# Patient Record
Sex: MALE | Race: OTHER | Employment: PART TIME | URBAN - METROPOLITAN AREA
[De-identification: names, ages, dates, MRNs, and addresses within clinical notes are randomized per-mention and may not be internally consistent; named-entity substitution may affect disease eponyms.]

---

## 2020-06-02 ENCOUNTER — HOSPITAL ENCOUNTER (EMERGENCY)
Facility: HOSPITAL | Age: 30
Discharge: HOME | End: 2020-06-02
Attending: EMERGENCY MEDICINE

## 2020-06-02 ENCOUNTER — APPOINTMENT (OUTPATIENT)
Dept: CT IMAGING | Facility: HOSPITAL | Age: 30
End: 2020-06-02
Attending: EMERGENCY MEDICINE

## 2020-06-02 VITALS
OXYGEN SATURATION: 98 % | HEART RATE: 93 BPM | RESPIRATION RATE: 18 BRPM | DIASTOLIC BLOOD PRESSURE: 85 MMHG | SYSTOLIC BLOOD PRESSURE: 122 MMHG

## 2020-06-02 DIAGNOSIS — S61.412A LACERATION OF LEFT HAND WITHOUT FOREIGN BODY, INITIAL ENCOUNTER: ICD-10-CM

## 2020-06-02 DIAGNOSIS — S06.0X0A CONCUSSION WITHOUT LOSS OF CONSCIOUSNESS, INITIAL ENCOUNTER: Primary | ICD-10-CM

## 2020-06-02 PROCEDURE — 99284 EMERGENCY DEPT VISIT MOD MDM: CPT

## 2020-06-02 PROCEDURE — 70450 CT HEAD/BRAIN W/O DYE: CPT | Performed by: EMERGENCY MEDICINE

## 2020-06-02 PROCEDURE — 72125 CT NECK SPINE W/O DYE: CPT | Performed by: EMERGENCY MEDICINE

## 2020-06-02 RX ORDER — ACETAMINOPHEN 500 MG
1000 TABLET ORAL ONCE
Status: COMPLETED | OUTPATIENT
Start: 2020-06-02 | End: 2020-06-02
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
## 2020-06-02 NOTE — ED PROVIDER NOTES
Patient Seen in: [facility] Emergency Department      History   Patient presents with:  Headache    Stated Complaint: headache    HPI    Patient is a 68-year-old male comes to emergency room for evaluation head and neck pain.   Patient states he was abrasions, no CVA tenderness  PELVIS: Pelvis is stable to compression. ABDOMEN: Bowel sounds are present, soft, nondistended, no pulsatile masses, nontender  MUSCULOSKELETAL: No calf tenderness, no clubbing, no cyanosis, or edema.   Dorsalis pedis and post Impression:  Concussion without loss of consciousness, initial encounter  (primary encounter diagnosis)  Laceration of left hand without foreign body, initial encounter    Disposition:  There is no disposition on file for this visit.   There is no dispositi

## 2020-06-02 NOTE — ED INITIAL ASSESSMENT (HPI)
Patient arrived via EMS. Under police custody. Patient refused to identify who they are, A&Ox4. Denies N/V no trauma at point of arrest per police. Patient states they have a 7/10 head pain, with ringing in their ears.
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
